# Patient Record
Sex: MALE | Race: WHITE
[De-identification: names, ages, dates, MRNs, and addresses within clinical notes are randomized per-mention and may not be internally consistent; named-entity substitution may affect disease eponyms.]

---

## 2017-05-27 ENCOUNTER — HOSPITAL ENCOUNTER (EMERGENCY)
Dept: HOSPITAL 54 - ER | Age: 52
Discharge: HOME | End: 2017-05-27
Payer: COMMERCIAL

## 2017-05-27 VITALS — BODY MASS INDEX: 21.9 KG/M2 | WEIGHT: 153 LBS | HEIGHT: 70 IN

## 2017-05-27 VITALS — DIASTOLIC BLOOD PRESSURE: 97 MMHG | SYSTOLIC BLOOD PRESSURE: 142 MMHG
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
## 2017-05-27 DIAGNOSIS — S61.216A: Primary | ICD-10-CM

## 2017-05-27 PROCEDURE — 90715 TDAP VACCINE 7 YRS/> IM: CPT

## 2017-05-27 PROCEDURE — 90471 IMMUNIZATION ADMIN: CPT

## 2017-05-27 PROCEDURE — A6403 STERILE GAUZE>16 <= 48 SQ IN: HCPCS

## 2017-05-27 PROCEDURE — A4606 OXYGEN PROBE USED W OXIMETER: HCPCS

## 2017-05-27 PROCEDURE — 99283 EMERGENCY DEPT VISIT LOW MDM: CPT

## 2017-05-27 PROCEDURE — Z7610: HCPCS

## 2020-06-11 ENCOUNTER — HOSPITAL ENCOUNTER (OUTPATIENT)
Dept: HOSPITAL 72 - GAS | Age: 55
Discharge: HOME | End: 2020-06-11
Payer: COMMERCIAL

## 2020-06-11 VITALS — SYSTOLIC BLOOD PRESSURE: 111 MMHG | DIASTOLIC BLOOD PRESSURE: 72 MMHG

## 2020-06-11 VITALS — DIASTOLIC BLOOD PRESSURE: 71 MMHG | SYSTOLIC BLOOD PRESSURE: 106 MMHG

## 2020-06-11 VITALS — SYSTOLIC BLOOD PRESSURE: 110 MMHG | DIASTOLIC BLOOD PRESSURE: 63 MMHG

## 2020-06-11 VITALS — DIASTOLIC BLOOD PRESSURE: 68 MMHG | SYSTOLIC BLOOD PRESSURE: 120 MMHG

## 2020-06-11 VITALS — DIASTOLIC BLOOD PRESSURE: 63 MMHG | SYSTOLIC BLOOD PRESSURE: 106 MMHG

## 2020-06-11 VITALS — BODY MASS INDEX: 21.9 KG/M2 | HEIGHT: 70 IN | WEIGHT: 153 LBS

## 2020-06-11 VITALS — DIASTOLIC BLOOD PRESSURE: 69 MMHG | SYSTOLIC BLOOD PRESSURE: 116 MMHG

## 2020-06-11 VITALS — DIASTOLIC BLOOD PRESSURE: 69 MMHG | SYSTOLIC BLOOD PRESSURE: 111 MMHG

## 2020-06-11 VITALS — SYSTOLIC BLOOD PRESSURE: 119 MMHG | DIASTOLIC BLOOD PRESSURE: 80 MMHG

## 2020-06-11 DIAGNOSIS — R13.10: Primary | ICD-10-CM

## 2020-06-11 DIAGNOSIS — K29.50: ICD-10-CM

## 2020-06-11 DIAGNOSIS — K21.0: ICD-10-CM

## 2020-06-11 PROCEDURE — 94150 VITAL CAPACITY TEST: CPT

## 2020-06-11 PROCEDURE — 94003 VENT MGMT INPAT SUBQ DAY: CPT

## 2020-06-11 PROCEDURE — 43239 EGD BIOPSY SINGLE/MULTIPLE: CPT

## 2020-06-11 NOTE — 48 HOUR POST ANESTHESIA EVAL
Post Anesthesia Evaluation


Procedure:  EGD with Bx


Date of Evaluation:  Jun 11, 2020


Time of Evaluation:  11:12


Blood Pressure Systolic:  133


0:  72


Pulse Rate:  66


Respiratory Rate:  22


Temperature (Fahrenheit):  97.6


O2 Sat by Pulse Oximetry:  98


Airway:  patent


Nausea:  No


Vomiting:  No


Pain Intensity:  1


Hydration Status:  adequate


Cardiopulmonary Status:


stable


Mental Status/LOC:  patient returned to baseline


Follow-up Care/Observations:


n/a


Post-Anesthesia Complications:


none


Follow-up care needed:  ready to discharge











Jigar Jones MD Jun 11, 2020 11:14

## 2020-06-11 NOTE — IMMEDIATE POST-OP EVALUATION
Immediate Post-Op Evalulation


Immediate Post-Op Evalulation


Procedure:  EGD with Bx


Date of Evaluation:  Jun 11, 2020


Time of Evaluation:  11:07


IV Fluids:  400


Blood Products:  none


Estimated Blood Loss:  none


Urinary Output:  none


Blood Pressure Systolic:  128


Blood Pressure Diastolic:  75


Pulse Rate:  82


Respiratory Rate:  20


O2 Sat by Pulse Oximetry:  99


Temperature (Fahrenheit):  98.1


Pain Score (1-10):  1


Nausea:  No


Vomiting:  No


Complications


none


Patient Status:  awake, patent, none


Hydration Status:  adequate











Jigar Jones MD Jun 11, 2020 11:09

## 2020-06-11 NOTE — SHORT STAY SURGERY H&P
History of Present Illness


History of Present Illness


Chief Complaint


see H&P


HPI


Shreyas Win is a 55 year old male who was admitted on  for GERD





Patient History


Allergies:  


Coded Allergies:  


     No Known Allergies (Unverified , 6/11/20)





Medication History


No Active Prescriptions or Reported Meds





Physical Exam


Vital Signs





Last Vital Signs








  Date Time  Temp Pulse Resp B/P (MAP) Pulse Ox O2 Delivery O2 Flow Rate FiO2


 


6/11/20 09:39      Room Air  


 


6/11/20 09:38 97.8 63 18 119/80 99   











Plan


Attestation


Are the patient's medical conditions optimized for surgery?











Deanna Morel MD Jun 11, 2020 10:44

## 2020-06-11 NOTE — ANETHESIA PREOPERATIVE EVAL
Anesthesia Pre-op PMH/ROS


General


Date of Evaluation:  Jun 11, 2020


Time of Evaluation:  10:04


Anesthesiologist:  Karen


ASA Score:  ASA 2


Mallampati Score


Class I : Soft palate, uvula, fauces, pillars visible


Class II: Soft palate, uvula, fauces visible


Class III: Soft palate, base of uvula visible


Class IV: Only hard plate visible


Mallampati Classification:  Class II


Surgeon:  Teri


Diagnosis:  Abdominal pain


Surgical Procedure:  EGD


Anesthesia History:  none


Family History:  no anesthesia problems


Allergies:  


Coded Allergies:  


     No Known Allergies (Unverified , 6/11/20)


Medications:  see eMAR


Patient NPO?:  Yes





Past Medical History


Cardiovascular:  Denies: HTN, CAD, MI, valve dz, arrhythmia, other


Pulmonary:  Denies: asthma, COPD, CLARE, other


Gastrointestinal/Genitourinary:  Reports: GERD; 


   Denies: CRI, ESRD, other


Neurologic/Psychiatric:  Denies: dementia, CVA, depression/anxiety, TIA, other


Endocrine:  Denies: DM, hypothyroidism, steroids, other


HEENT:  Denies: cataract (L), cataract (R), glaucoma, [place] (L), [place] (R), other


Hematology/Immune:  Denies: anemia, DVT, bleeding disorder, other


Musculoskeletal/Integumentary:  Denies: OA, RA, DJD, DDD, edema, other


PMH Narrative:


as above


PSxH Narrative:


T&A





Anesthesia Pre-op Phys. Exam


Physician Exam





Last Vital Signs








  Date Time  Temp Pulse Resp B/P (MAP) Pulse Ox O2 Delivery O2 Flow Rate FiO2


 


6/11/20 09:39      Room Air  


 


6/11/20 09:38 97.8 63 18 119/80 99   








Constitutional:  NAD


Neurologic:  CN 2-12 intact


Cardiovascular:  RRR, no M/R/G


Respiratory:  CTA


Gastrointestinal:  S/NT/ND





Airway Exam


Mallampati Score:  Class II


MO:  full


Neck:  flexible


ROM:  full


Teeth:  intact


Dentures:  no upper, no lower





Anesthesia Pre-op A/P


Risk Assessment & Plan


Assessment:


ASA 2


Plan:


MAC


Status Change Before Surgery:  Jigar Rios MD Jun 11, 2020 10:06

## 2020-06-11 NOTE — OPERATIVE NOTE - DICTATED
DATE OF OPERATION:  06/11/2020

GASTROENTEROLOGY PROCEDURE REPORT



PROCEDURE:  Upper gastrointestinal endoscopy with biopsy.



SURGEON:  Deanna Morel MD.



ANESTHESIA:  Please see the separate anesthesiologist's notes for details

by Dr. Jigar Jones.



PRE-ENDOSCOPIC DIAGNOSES:  Symptoms of gastroesophageal reflux as well as

dysphagia.



POST-ENDOSCOPIC DIAGNOSES:

1. Linear erosion in the lower esophagus consistent with

gastroesophageal reflux.

2. Irregular Z-line, rule out short-segment Alejo's.

3. Mild fundus gastritis.



DESCRIPTION OF PROCEDURE:  The procedure, its risks, indications,

alternatives, and possible complications including but not limited to

bleeding, infection, perforation, death, and anesthesia complications were

explained to the patient and informed consent was obtained.  Patient was

then sedated in the left lateral decubitus position.  A diagnostic upper

endoscope was introduced into oropharynx and advanced to the duodenum

without difficulty.  The endoscope was then gradually withdrawn and the

mucosa examined carefully.  Examination of the upper gastrointestinal

mucosa was notable for above findings.  The biopsies of the fundus and the

lower esophagus at 39 cm and midesophagus were sent to pathology for

review.  The irregularity of the Z-line in the lower esophagus consistent

with a of 1 cm Ulysses-colored mucosa, which came up in the esophagus

suggestive of possible short-segment Alejo's.  This area was biopsied

for evaluation.  The endoscope was removed.  The patient was sent to

recovery in good condition.



COMPLICATIONS:  None.



RECOMMENDATIONS:

1. Follow up biopsy results.

2. Begin famotidine 40 mg p.o. at bedtime and continue long term.

3. Reflux precautions.

4. Outpatient followup.









  ______________________________________________

  Deanna Morel M.D.





DR:  OWEN

D:  06/11/2020 13:04

T:  06/11/2020 17:11

JOB#:  [id number]

CC:  MD DEANNA Crow M.D.  ; FAX#:  [phone]

## 2020-06-11 NOTE — PRE-PROCEDURE NOTE/ATTESTATION
Pre-Procedure Note/Attestation


Complete Prior to Procedure


Planned Procedure:  not applicable


Procedure Narrative:


egd





Indications for Procedure


Pre-Operative Diagnosis:


GERD  , dysphagia





Attestation


I attest that I discussed the nature of the procedure; its benefits; risks and 

complications; and alternatives (and the risks and benefits of such alternatives

), prior to the procedure, with the patient (or the patient's legal 

representative).





I attest that, if there was a reasonable possibility of needing a blood 

transfusion, the patient (or the patient's legal representative) was given the 

[facility] standardized written summary, pursuant 

to the [place] Blood Safety Act ([place] Health and Safety Code # 1645, as 

amended).





I attest that I re-evaluated the patient just prior to the surgery and that 

there has been no change in the patient's H&P, except as documented below:











Deanna Morel MD Jun 11, 2020 10:43

## 2020-06-22 NOTE — BRIEF OPERATIVE NOTE
Immediate Post Operative Note


Operative Note


Chief Complaint:  GERD Dysphagia


Pre-op Diagnosis:


GERD  , dysphagia


Procedure:


egd


Post-op Diagnosis:


1. Linear erosion in the lower esophagus consistent with


gastroesophageal reflux.


2. Irregular Z-line, rule out short-segment Alejo's.


3. Mild fundus gastritis.


Surgeon:  cole


Anesthesiologist:  See separate report


Specimen:  yes


Complications:  none


Condition:  stable


Fluids:  per anesthesia


Implant(s) used?:  No











Deanna Morel MD Jun 22, 2020 12:58

## 2020-06-22 NOTE — ENDOSCOPY PROCEDURE NOTE
Endoscopy Procedure Note


General


Indication for Procedure:  gerd dysphagia


Procedures Performed:  EGD


Operative Findings/Diagnosis:  GERD


Specimen:  yes


Pt Tolerated Procedure Well:  Yes


Estimated Blood Loss:  none





Anesthesia


Anesthesiologist:  see report


Anesthesia:  MAC





Medications


Medication Given:  see anesthesia record





Inserted Devices


Implant(s) used?:  No





GI Core Measures


50 yrs or older w/o bx or poly:  Not Applicable


10yrs. F/U recommended:  Not Applicable











Deanna Morel MD Jun 22, 2020 12:57